# Patient Record
Sex: FEMALE | Race: WHITE | NOT HISPANIC OR LATINO | URBAN - METROPOLITAN AREA
[De-identification: names, ages, dates, MRNs, and addresses within clinical notes are randomized per-mention and may not be internally consistent; named-entity substitution may affect disease eponyms.]

---

## 2023-01-31 ENCOUNTER — TRANSFERRED RECORDS (OUTPATIENT)
Dept: HEALTH INFORMATION MANAGEMENT | Facility: CLINIC | Age: 33
End: 2023-01-31

## 2023-02-02 ENCOUNTER — TRANSCRIBE ORDERS (OUTPATIENT)
Dept: OTHER | Age: 33
End: 2023-02-02

## 2023-02-02 ENCOUNTER — TELEPHONE (OUTPATIENT)
Dept: OPHTHALMOLOGY | Facility: CLINIC | Age: 33
End: 2023-02-02
Payer: COMMERCIAL

## 2023-02-02 DIAGNOSIS — H47.11 PAPILLEDEMA ASSOCIATED WITH INCREASED INTRACRANIAL PRESSURE: Primary | ICD-10-CM

## 2023-02-02 NOTE — TELEPHONE ENCOUNTER
Called and spoke to Clare     Confirmed she will take 2/16  @ 1215 pm with Dr. Erasto milton     Provided information about parking and mailed out a new pt packet     Hailey Matta Communication Facilitator on 2/2/2023 at 5:04 PM

## 2023-02-15 PROBLEM — F32.5 DEPRESSION, MAJOR, IN REMISSION (H): Status: ACTIVE | Noted: 2021-06-10

## 2023-02-16 ENCOUNTER — LAB (OUTPATIENT)
Dept: LAB | Facility: CLINIC | Age: 33
End: 2023-02-16
Payer: COMMERCIAL

## 2023-02-16 ENCOUNTER — OFFICE VISIT (OUTPATIENT)
Dept: OPHTHALMOLOGY | Facility: CLINIC | Age: 33
End: 2023-02-16
Attending: OPHTHALMOLOGY
Payer: COMMERCIAL

## 2023-02-16 DIAGNOSIS — H46.9 OPTIC NEUROPATHY: Primary | ICD-10-CM

## 2023-02-16 DIAGNOSIS — H47.11 PAPILLEDEMA ASSOCIATED WITH INCREASED INTRACRANIAL PRESSURE: Primary | ICD-10-CM

## 2023-02-16 DIAGNOSIS — H46.9 OPTIC NEUROPATHY: ICD-10-CM

## 2023-02-16 LAB
HCT VFR BLD AUTO: 39.2 % (ref 35–47)
T PALLIDUM AB SER QL: NONREACTIVE
VIT B12 SERPL-MCNC: 945 PG/ML (ref 232–1245)

## 2023-02-16 PROCEDURE — 99000 SPECIMEN HANDLING OFFICE-LAB: CPT | Performed by: PATHOLOGY

## 2023-02-16 PROCEDURE — 82747 ASSAY OF FOLIC ACID RBC: CPT | Mod: 90 | Performed by: PATHOLOGY

## 2023-02-16 PROCEDURE — 86780 TREPONEMA PALLIDUM: CPT | Performed by: OPHTHALMOLOGY

## 2023-02-16 PROCEDURE — G0463 HOSPITAL OUTPT CLINIC VISIT: HCPCS | Mod: 25

## 2023-02-16 PROCEDURE — 99204 OFFICE O/P NEW MOD 45 MIN: CPT | Performed by: OPHTHALMOLOGY

## 2023-02-16 PROCEDURE — 84425 ASSAY OF VITAMIN B-1: CPT | Mod: 90 | Performed by: PATHOLOGY

## 2023-02-16 PROCEDURE — 83655 ASSAY OF LEAD: CPT | Mod: 90 | Performed by: PATHOLOGY

## 2023-02-16 PROCEDURE — 83921 ORGANIC ACID SINGLE QUANT: CPT | Performed by: OPHTHALMOLOGY

## 2023-02-16 PROCEDURE — 82607 VITAMIN B-12: CPT | Performed by: OPHTHALMOLOGY

## 2023-02-16 PROCEDURE — 92133 CPTRZD OPH DX IMG PST SGM ON: CPT | Performed by: OPHTHALMOLOGY

## 2023-02-16 PROCEDURE — 82175 ASSAY OF ARSENIC: CPT | Mod: 90 | Performed by: PATHOLOGY

## 2023-02-16 PROCEDURE — 92083 EXTENDED VISUAL FIELD XM: CPT | Performed by: OPHTHALMOLOGY

## 2023-02-16 PROCEDURE — 36415 COLL VENOUS BLD VENIPUNCTURE: CPT | Performed by: PATHOLOGY

## 2023-02-16 PROCEDURE — 83090 ASSAY OF HOMOCYSTEINE: CPT | Performed by: OPHTHALMOLOGY

## 2023-02-16 PROCEDURE — 83825 ASSAY OF MERCURY: CPT | Mod: 90 | Performed by: PATHOLOGY

## 2023-02-16 PROCEDURE — 85014 HEMATOCRIT: CPT | Performed by: PATHOLOGY

## 2023-02-16 ASSESSMENT — CUP TO DISC RATIO: OD_RATIO: 0.45

## 2023-02-16 ASSESSMENT — TONOMETRY
OS_IOP_MMHG: 18
IOP_METHOD: ICARE
OD_IOP_MMHG: 19

## 2023-02-16 ASSESSMENT — CONF VISUAL FIELD
OD_INFERIOR_TEMPORAL_RESTRICTION: 0
OS_SUPERIOR_NASAL_RESTRICTION: 0
OD_SUPERIOR_TEMPORAL_RESTRICTION: 0
OS_SUPERIOR_TEMPORAL_RESTRICTION: 0
OS_NORMAL: 1
OD_NORMAL: 1
OD_INFERIOR_NASAL_RESTRICTION: 0
OS_INFERIOR_TEMPORAL_RESTRICTION: 0
OD_SUPERIOR_NASAL_RESTRICTION: 0
METHOD: COUNTING FINGERS
OS_INFERIOR_NASAL_RESTRICTION: 0

## 2023-02-16 ASSESSMENT — REFRACTION_WEARINGRX
SPECS_TYPE: SVL
OS_SPHERE: -0.50
OD_SPHERE: -0.75
OS_AXIS: 176
OD_CYLINDER: +1.50
OS_CYLINDER: +1.50
OD_AXIS: 165

## 2023-02-16 ASSESSMENT — SLIT LAMP EXAM - LIDS
COMMENTS: NORMAL
COMMENTS: NORMAL

## 2023-02-16 ASSESSMENT — VISUAL ACUITY
CORRECTION_TYPE: GLASSES
OD_CC: 20/20
OS_CC: 20/20
METHOD: SNELLEN - LINEAR

## 2023-02-16 ASSESSMENT — EXTERNAL EXAM - LEFT EYE: OS_EXAM: NORMAL

## 2023-02-16 ASSESSMENT — EXTERNAL EXAM - RIGHT EYE: OD_EXAM: NORMAL

## 2023-02-16 NOTE — PROGRESS NOTES
"     Clare Andrade is a 33 year old female with the following diagnoses:   1. Optic neuropathy         Patient was sent for consultation by Dr. Ocampo for suspected papilledema.    HPI:    Patient reports in early January she had a sensation of her eyes \"floating, crossing\" and blurry vision twice.  Her first episode lasted a few hours, her second episode lasted 5 or more hours.  She had some haloing/shadowing of images that were not relieved with monocular occlusion.    She saw Dr. Ocampo on 1/31/23 who noted blurred disc margins in both eyes.    She has had headaches off and on since middle school.  She rarely takes ibuprofen as her headaches are mild; they are normally relieved with water and rest.  She has headaches about 5 times monthly and this has been true since onset.  They have not increased in intensity or frequency.    She denies TVOs or pulsatile tinnitus.  She has had intermittent high OR low pitched ringing tinnitus primarily in her right ear for many years.    She has a child and is trying to get pregnant (currently taking prenatal vitamins).  She was on birth control, previously (discontinued 3 years ago).  She does not have a history of taking Accutane, topical retinoid use.  She does not recall any tetracyclines prescribed in the last few years.    She reports a fairly nutritious diet throughout her life.  She denies history of pain on eye movement, limb/weakness or numbness.  She denies history of exposure to lead, other heavy metals, or toxic/chemical exposure.    She has not had any neuroimaging.    Independent historians:  Patient    Review of outside testing:    3/23/22 -- Hgb 13.0    My interpretation performed today of outside testing:  Nothing      Review of outside clinical notes:    1/31/23 -- Visit with Dr. Ocampo        Past medical history:    Patient Active Problem List   Diagnosis     Depression, major, in remission (H)     Pre-eclampsia-->HELP syndrome, partial placenta " abruption    Medications:   This patient does not have an active medication from one of the medication groupers.      Family history / social history:  Patient's family history is remarkable for diabetes (maternal grandfather) and lung cancer (maternal grandmother).  She denies family history of glaucoma.    Patient denies history of smoking or tobacco use.  She is 2.5 years sober.      Exam:  Corrected distance visual acuity was 20/20 in the right eye and 20/20 in the left eye. Intraocular pressure was 19 in the right eye and 18 in the left eye using ICare.  Color vision 11/11 right eye and 11/11 left eye.  Pupils isocoric with no APD.  Anterior segment exam was unremarkable.  Fundus exam revealed diffuse moderate pallor and significant superior and temporal rim thinning of the left optic nerve.    Tests ordered and interpreted today:  OCT RNFL:  Right eye: Average RNFL 83 microns.  Mild superior thinning.  Left eye: Average RNFL 69 microns.  Moderate superior and temporal thinning.    GTop:  Right eye: Reliable.  Mean deviation -1.1 dB.  Central scotoma.  Left eye: Reliable.  Mean deviation -5.7 dB.  Inferior arcuate.    Assessment and Plan:   It is my impression that this patient has bilateral optic neuropathy left eye>right eye.  She is largely asymptomatic with normal visual acuity and color vision.  There is retinal nerve fiber layer thinning both eyes.  I will order a Brain MRI WWO and nutritional/metabolic work-up. If this is normal, then I would consider monitoring her for glaucoma as she has cupping in the LEFT eye and cup to disc ratio asymmetry.           Attending Physician Attestation:  Complete documentation of historical and exam elements from today's encounter can be found in the full encounter summary report (not reduplicated in this progress note).  I personally obtained the chief complaint(s) and history of present illness.  I confirmed and edited as necessary the review of systems, past  medical/surgical history, family history, social history, and examination findings as documented by others; and I examined the patient myself.  I personally reviewed the relevant tests, images, and reports as documented above.  I formulated and edited as necessary the assessment and plan and discussed the findings and management plan with the patient and family. - Michael Mcmanus, OD

## 2023-02-16 NOTE — NURSING NOTE
Chief Complaint(s) and History of Present Illness(es)     New Patient    In both eyes.  Since onset it is stable.  Associated symptoms include headache.  Negative for double vision and eye pain.  Pain was noted as 0/10.           Comments    Clare Andrade is a 33 year old year old female sent for consultation by Dr. Clari Ocampo for papilledema.    Clare reports of 2 episodes with blurry vision and double vision in Jan 2023.  Her eyes felt gunky, almost like something was in it.  The first time this happened, it lasted for a few hours.  The second episode stayed with her for about 5 hours.  She also gets dizzy spells.  Headaches are consistent but she also has a 1 yr old and thinks some may be related to that.  She reports of bilateral ear ringing, more so in the right ear.     SELMA Miner 2/16/2023 12:31 PM

## 2023-02-16 NOTE — LETTER
"2023         RE:  :  MRN: Clare Andrade  1990  [id number]     Dear Dr. Clari Ocampo,    Thank you for asking me to see your very pleasant patient, Clare Andrade, in neuro-ophthalmic consultation.  I would like to thank you for sending your records and I have summarized them in the history of present illness. My assessment and plan are below.  For further details, please see my attached clinic note.      Clare Andrade is a 33 year old female with the following diagnoses:   1. Optic neuropathy         Patient was sent for consultation by Dr. Ocampo for suspected papilledema.    HPI:    Patient reports in early January she had a sensation of her eyes \"floating, crossing\" and blurry vision twice.  Her first episode lasted a few hours, her second episode lasted 5 or more hours.  She had some haloing/shadowing of images that were not relieved with monocular occlusion.    She saw Dr. Ocampo on 23 who noted blurred disc margins in both eyes.    She has had headaches off and on since middle school.  She rarely takes ibuprofen as her headaches are mild; they are normally relieved with water and rest.  She has headaches about 5 times monthly and this has been true since onset.  They have not increased in intensity or frequency.    She denies TVOs or pulsatile tinnitus.  She has had intermittent high OR low pitched ringing tinnitus primarily in her right ear for many years.    She has a child and is trying to get pregnant (currently taking prenatal vitamins).  She was on birth control, previously (discontinued 3 years ago).  She does not have a history of taking Accutane, topical retinoid use.  She does not recall any tetracyclines prescribed in the last few years.    She reports a fairly nutritious diet throughout her life.  She denies history of pain on eye movement, limb/weakness or numbness.  She denies history of exposure to lead, other heavy metals, or toxic/chemical exposure.    She has not had any " neuroimaging.    Independent historians:  Patient    Review of outside testing:    3/23/22 -- Hgb 13.0    My interpretation performed today of outside testing:  Nothing      Review of outside clinical notes:    1/31/23 -- Visit with Dr. Ocampo        Past medical history:    Patient Active Problem List   Diagnosis     Depression, major, in remission (H)     Pre-eclampsia-->HELP syndrome, partial placenta abruption    Medications:   This patient does not have an active medication from one of the medication groupers.      Family history / social history:  Patient's family history is remarkable for diabetes (maternal grandfather) and lung cancer (maternal grandmother).  She denies family history of glaucoma.    Patient denies history of smoking or tobacco use.  She is 2.5 years sober.      Exam:  Corrected distance visual acuity was 20/20 in the right eye and 20/20 in the left eye. Intraocular pressure was 19 in the right eye and 18 in the left eye using ICare.  Color vision 11/11 right eye and 11/11 left eye.  Pupils isocoric with no APD.  Anterior segment exam was unremarkable.  Fundus exam revealed diffuse moderate pallor and significant superior and temporal rim thinning of the left optic nerve.    Tests ordered and interpreted today:  OCT RNFL:  Right eye: Average RNFL 83 microns.  Mild superior thinning.  Left eye: Average RNFL 69 microns.  Moderate superior and temporal thinning.    GTop:  Right eye: Reliable.  Mean deviation -1.1 dB.  Central scotoma.  Left eye: Reliable.  Mean deviation -5.7 dB.  Inferior arcuate.    Assessment and Plan:   It is my impression that this patient has bilateral optic neuropathy left eye>right eye.  She is largely asymptomatic with normal visual acuity and color vision.  There is retinal nerve fiber layer thinning both eyes.  I will order a Brain MRI WWO and nutritional/metabolic work-up. If this is normal, then I would consider monitoring her for glaucoma as she has cupping in  the LEFT eye and cup to disc ratio asymmetry.          Again, thank you for allowing me to participate in the care of your patient.      Sincerely,    Michael Maurer MD  Professor  Ophthalmology Residency   Director of Neuro-Ophthalmology  [employer] Endowed Chair  Departments of Ophthalmology, Neurology, and Neurosurgery  [facility] [address] [place]  [address]  T - [phone]  F - [phone]  [email or web address]      CC: Clari Ocampo  [facility]  [facility] [facility] [address]  Via Fax: [phone]

## 2023-02-16 NOTE — Clinical Note
"2/16/2023       RE: Clare Andrade  [id number] [address] [place] [id number]     Dear Colleague,    Thank you for referring your patient, Clare Andrade, to the [facility] [place] at [facility]. Please see a copy of my visit note below.        Clare Andrade is a 33 year old female with the following diagnoses:   1. Optic neuropathy         Patient was sent for consultation by Dr. Ocampo for suspected papilledema.    HPI:    Patient reports in early January she had a sensation of her eyes \"floating, crossing\" and blurry vision twice.  Her first episode lasted a few hours, her second episode lasted 5 or more hours.  She had some haloing/shadowing of images that were not relieved with monocular occlusion.    She saw Dr. Ocampo on 1/31/23 who noted blurred disc margins in both eyes.    She has had headaches off and on since middle school.  She rarely takes ibuprofen as her headaches are mild; they are normally relieved with water and rest.  She has headaches about 5 times monthly and this has been true since onset.  They have not increased in intensity or frequency.    She denies TVOs or pulsatile tinnitus.  She has had intermittent high OR low pitched ringing tinnitus primarily in her right ear for many years.    She has a child and is trying to get pregnant (currently taking prenatal vitamins).  She was on birth control, previously (discontinued 3 years ago).  She does not have a history of taking Accutane, topical retinoid use.  She does not recall any tetracyclines prescribed in the last few years.    She reports a fairly nutritious diet throughout her life.  She denies history of pain on eye movement, limb/weakness or numbness.  She denies history of exposure to lead, other heavy metals, or toxic/chemical exposure.    She has not had any neuroimaging.    Independent historians:  Patient    Review of outside testing:    3/23/22 -- Hgb 13.0    My " interpretation performed today of outside testing:  Nothing      Review of outside clinical notes:    1/31/23 -- Visit with Dr. Ocampo        Past medical history:    Patient Active Problem List   Diagnosis     Depression, major, in remission (H)     Pre-eclampsia-->HELP syndrome, partial placenta abruption    Medications:   This patient does not have an active medication from one of the medication groupers.      Family history / social history:  Patient's family history is remarkable for diabetes (maternal grandfather) and lung cancer (maternal grandmother).  She denies family history of glaucoma.    Patient denies history of smoking or tobacco use.  She is 2.5 years sober.      Exam:  Corrected distance visual acuity was 20/20 in the right eye and 20/20 in the left eye. Intraocular pressure was 19 in the right eye and 18 in the left eye using ICare.  Color vision 11/11 right eye and 11/11 left eye.  Pupils isocoric with no APD.  Anterior segment exam was unremarkable.  Fundus exam revealed diffuse moderate pallor and significant superior and temporal rim thinning of the left optic nerve.    Tests ordered and interpreted today:  OCT RNFL:  Right eye: Average RNFL 83 microns.  Mild superior thinning.  Left eye: Average RNFL 69 microns.  Moderate superior and temporal thinning.    GTop:  Right eye: Reliable.  Mean deviation -1.1 dB.  Central scotoma.  Left eye: Reliable.  Mean deviation -5.7 dB.  Inferior arcuate.    Assessment and Plan:   It is my impression that this patient has bilateral optic neuropathy left eye>right eye.  She is largely asymptomatic with normal visual acuity and color vision.  There is retinal nerve fiber layer thinning both eyes.  I will order a Brain MRI WWO and nutritional/metabolic work-up. If this is normal, then I would consider monitoring her for glaucoma as she has cupping in the LEFT eye and cup to disc ratio asymmetry.          Attending Physician Attestation:  Complete documentation  of historical and exam elements from today's encounter can be found in the full encounter summary report (not reduplicated in this progress note).  I personally obtained the chief complaint(s) and history of present illness.  I confirmed and edited as necessary the review of systems, past medical/surgical history, family history, social history, and examination findings as documented by others; and I examined the patient myself.  I personally reviewed the relevant tests, images, and reports as documented above.  I formulated and edited as necessary the assessment and plan and discussed the findings and management plan with the patient and family. - Michael Mcmanus, OD        Again, thank you for allowing me to participate in the care of your patient.      Sincerely,    Michael Maurer MD

## 2023-02-18 LAB
ARSENIC BLD-MCNC: <10 UG/L
FOLATE RBC-MCNC: 994 NG/ML
LEAD BLDV-MCNC: <2 UG/DL
MERCURY BLD-MCNC: <2.5 UG/L

## 2023-02-20 LAB — VIT B1 PYROPHOSHATE BLD-SCNC: 106 NMOL/L

## 2023-02-22 ENCOUNTER — TELEPHONE (OUTPATIENT)
Dept: OPHTHALMOLOGY | Facility: CLINIC | Age: 33
End: 2023-02-22
Payer: COMMERCIAL

## 2023-02-22 LAB
HCYS SERPL-SCNC: 4.7 UMOL/L (ref 4–12)
METHYLMALONATE SERPL-SCNC: 0.24 UMOL/L (ref 0–0.4)

## 2023-02-22 NOTE — TELEPHONE ENCOUNTER
Left voicemail for patient regarding labs being unremarkable.  Just waiting on MRI and it looks like that is scheduled for 2/24 at [place].  Will be in touch with the results after we review.     Vani Serrato on 2/22/2023 at 2:40 PM

## 2023-03-21 ENCOUNTER — TELEPHONE (OUTPATIENT)
Dept: OPHTHALMOLOGY | Facility: CLINIC | Age: 33
End: 2023-03-21
Payer: COMMERCIAL

## 2023-03-21 NOTE — TELEPHONE ENCOUNTER
[facility]    Phone Message    May a detailed message be left on voicemail: yes     Reason for Call: Requesting Results   Name/type of test: MRI   Date of test: 3/10/23  Was test done at a location other than [facility] (Please fill in the location if not [facility])?: Yes: [facility]    Pt states she requested the MRI and results to be released and is wondering if Dr. Maurer has received them yet.      Action Taken: Message routed to:  [facility] ([facility] EYE    Travel Screening: Not Applicable

## 2023-03-21 NOTE — TELEPHONE ENCOUNTER
Images have been uploaded into PACS and report in Care everywhere.  Forwarded to Dr. Mcmanus to review.      Vani Serrato on 3/21/2023 at 3:56 PM

## 2023-03-22 ENCOUNTER — TELEPHONE (OUTPATIENT)
Dept: OPHTHALMOLOGY | Facility: CLINIC | Age: 33
End: 2023-03-22
Payer: COMMERCIAL

## 2023-03-22 NOTE — TELEPHONE ENCOUNTER
Spoke with patient to review normal MRI results.  Advised her to keep her 6 month appointment with her optometrist to monitor for possible glaucomatous changes.    Coco Mcmanus, OD